# Patient Record
Sex: FEMALE | Race: WHITE
[De-identification: names, ages, dates, MRNs, and addresses within clinical notes are randomized per-mention and may not be internally consistent; named-entity substitution may affect disease eponyms.]

---

## 2018-04-21 ENCOUNTER — HOSPITAL ENCOUNTER (OUTPATIENT)
Dept: HOSPITAL 76 - DI | Age: 43
Discharge: HOME | End: 2018-04-21
Attending: REGISTERED NURSE
Payer: COMMERCIAL

## 2018-04-21 DIAGNOSIS — Z84.89: Primary | ICD-10-CM

## 2018-04-21 PROCEDURE — 70544 MR ANGIOGRAPHY HEAD W/O DYE: CPT

## 2018-04-21 NOTE — MRI REPORT
EXAM

MRA BRAIN 

 

EXAM DATE: 4/21/2018 01:10 PM.

 

CLINICAL HISTORY: 42-year-old with paternal family history of brain aneurysms presenting with headach
e. Evaluate for intracranial vascular pathology.

 

COMPARISON: None.

 

TECHNIQUE: Multiplanar, multisequence MRA sequences of the brain were performed. Other: None. Post-pr
ocessing: Multiplanar 3D MIP reconstructions. IV Contrast: None.

 

FINDINGS:

RIGHT

Internal Carotid (ICA): No aneurysm, stenosis or anomaly.

Middle Cerebral (MCA): No aneurysm, stenosis or anomaly.

Anterior Cerebral (ABBIE): No aneurysm, stenosis or anomaly.

Posterior Cerebral (PCA): No aneurysm, stenosis or anomaly.

Posterior Communicating (P-COM): Not definitively seen. No aneurysm.

 

Vertebral: No aneurysm, stenosis or anomaly in the visualized upper vertebral artery.

 

LEFT

Internal Carotid (ICA): No aneurysm, stenosis or anomaly.

Middle Cerebral (MCA): No aneurysm, stenosis or anomaly.

Anterior Cerebral (ABBIE): No aneurysm, stenosis or anomaly.

Posterior Cerebral (PCA): No aneurysm, stenosis or anomaly.

Posterior Communicating (P-COM): Not definitively seen. No aneurysm.

 

Vertebral: No aneurysm, stenosis or anomaly in the visualized upper vertebral artery.

 

MIDLINE

Anterior Communicating (A-COM): No aneurysm, stenosis or anomaly.

Basilar Artery:No aneurysm, stenosis or anomaly.

 

Other: None.

 

IMPRESSION: 

1. No large vessel occlusion.

2. No intracranial aneurysm, significant stenosis, or vascular anomaly seen.

 

[facility]

Referring Provider Line: [phone]

 

SITE ID: [id number]